# Patient Record
Sex: FEMALE | Race: WHITE | NOT HISPANIC OR LATINO | Employment: UNEMPLOYED | URBAN - METROPOLITAN AREA
[De-identification: names, ages, dates, MRNs, and addresses within clinical notes are randomized per-mention and may not be internally consistent; named-entity substitution may affect disease eponyms.]

---

## 2023-02-16 ENCOUNTER — OFFICE VISIT (OUTPATIENT)
Dept: OBSTETRICS AND GYNECOLOGY | Facility: CLINIC | Age: 48
End: 2023-02-16
Payer: MEDICAID

## 2023-02-16 VITALS
DIASTOLIC BLOOD PRESSURE: 86 MMHG | HEIGHT: 64 IN | WEIGHT: 210.75 LBS | BODY MASS INDEX: 35.98 KG/M2 | SYSTOLIC BLOOD PRESSURE: 124 MMHG

## 2023-02-16 DIAGNOSIS — R10.2 PELVIC PAIN: Primary | ICD-10-CM

## 2023-02-16 DIAGNOSIS — R87.613 HGSIL (HIGH GRADE SQUAMOUS INTRAEPITHELIAL LESION) ON PAP SMEAR OF CERVIX: ICD-10-CM

## 2023-02-16 PROCEDURE — 3079F DIAST BP 80-89 MM HG: CPT | Mod: CPTII,,, | Performed by: SPECIALIST

## 2023-02-16 PROCEDURE — 87624 HPV HI-RISK TYP POOLED RSLT: CPT | Performed by: SPECIALIST

## 2023-02-16 PROCEDURE — 88175 CYTOPATH C/V AUTO FLUID REDO: CPT | Performed by: PATHOLOGY

## 2023-02-16 PROCEDURE — 1159F PR MEDICATION LIST DOCUMENTED IN MEDICAL RECORD: ICD-10-PCS | Mod: CPTII,,, | Performed by: SPECIALIST

## 2023-02-16 PROCEDURE — 99204 PR OFFICE/OUTPT VISIT, NEW, LEVL IV, 45-59 MIN: ICD-10-PCS | Mod: S$PBB,,, | Performed by: SPECIALIST

## 2023-02-16 PROCEDURE — 88141 PR  CYTOPATH CERV/VAG INTERPRET: ICD-10-PCS | Mod: ,,, | Performed by: PATHOLOGY

## 2023-02-16 PROCEDURE — 3074F SYST BP LT 130 MM HG: CPT | Mod: CPTII,,, | Performed by: SPECIALIST

## 2023-02-16 PROCEDURE — 99999 PR PBB SHADOW E&M-NEW PATIENT-LVL III: ICD-10-PCS | Mod: PBBFAC,,, | Performed by: SPECIALIST

## 2023-02-16 PROCEDURE — 99203 OFFICE O/P NEW LOW 30 MIN: CPT | Mod: PBBFAC,PN | Performed by: SPECIALIST

## 2023-02-16 PROCEDURE — 99204 OFFICE O/P NEW MOD 45 MIN: CPT | Mod: S$PBB,,, | Performed by: SPECIALIST

## 2023-02-16 PROCEDURE — 3008F PR BODY MASS INDEX (BMI) DOCUMENTED: ICD-10-PCS | Mod: CPTII,,, | Performed by: SPECIALIST

## 2023-02-16 PROCEDURE — 3008F BODY MASS INDEX DOCD: CPT | Mod: CPTII,,, | Performed by: SPECIALIST

## 2023-02-16 PROCEDURE — 88141 CYTOPATH C/V INTERPRET: CPT | Mod: ,,, | Performed by: PATHOLOGY

## 2023-02-16 PROCEDURE — 3079F PR MOST RECENT DIASTOLIC BLOOD PRESSURE 80-89 MM HG: ICD-10-PCS | Mod: CPTII,,, | Performed by: SPECIALIST

## 2023-02-16 PROCEDURE — 3074F PR MOST RECENT SYSTOLIC BLOOD PRESSURE < 130 MM HG: ICD-10-PCS | Mod: CPTII,,, | Performed by: SPECIALIST

## 2023-02-16 PROCEDURE — 99999 PR PBB SHADOW E&M-NEW PATIENT-LVL III: CPT | Mod: PBBFAC,,, | Performed by: SPECIALIST

## 2023-02-16 PROCEDURE — 1159F MED LIST DOCD IN RCRD: CPT | Mod: CPTII,,, | Performed by: SPECIALIST

## 2023-02-16 RX ORDER — SEMAGLUTIDE 1.34 MG/ML
0.5 INJECTION, SOLUTION SUBCUTANEOUS
COMMUNITY
Start: 2023-01-31

## 2023-02-16 RX ORDER — GABAPENTIN 300 MG/1
CAPSULE ORAL
COMMUNITY
Start: 2023-01-31

## 2023-02-16 NOTE — PROGRESS NOTES
47 y.o. White or  female G 2 P 2 with BTL & history of endometrial ablation who had HSIL Pap with positive HPV on 8/19/2022 & TRAE 2/3 on biopsy of the cervix on 9/6/2022 who is S/P LEEP, FxD&C on 10/7/2022   Pathology from the above as follows...  Pathology-  Final Diagnosis   1. Ectocervix, cone:  - High grade squamous intraepithelial lesion less than 1 mm from the endocervical margin of this specimen.     2. Endocervix, cone:  - High grade squamous intraepithelial lesion.  - True margin appears involved.     3. ECC:  - Fragments of high grade squamous intraepithelial lesion.     4. EMB:  - Limited sample showing a fragment of high grade squamous intraepithelial lesion.  - Possible uterine smooth muscle.  - Endocervical tissue and mucus noted too.        Note: P16 immunochemistry with appropriate controls is performed and findings are incorporated into the above final diagnosis. Chart is reviewed.   Electronically signed by Chandler Chang MD on 10/20/2022 at 1425      Pt denies vaginal bleeding, PP, d/c, weight loss/gain, dysuria, hematuria, n/v.    Past Medical History:   Diagnosis Date    Abnormal Pap smear of cervix     Hepatitis B        Past Surgical History:   Procedure Laterality Date    CERVICAL BIOPSY  W/ LOOP ELECTRODE EXCISION      CHOLECYSTECTOMY      NASAL SEPTUM SURGERY      TUBAL LIGATION         Family History   Problem Relation Age of Onset    Breast cancer Neg Hx     Ovarian cancer Neg Hx        Social History     Socioeconomic History    Marital status:    Tobacco Use    Smoking status: Every Day     Packs/day: 1.00     Years: 22.00     Pack years: 22.00     Types: Cigarettes   Substance and Sexual Activity    Alcohol use: Yes     Alcohol/week: 0.8 standard drinks     Types: 1 drink(s) per week    Drug use: No    Sexual activity: Yes     Partners: Male     Birth control/protection: See Surgical Hx       Current Outpatient Medications   Medication Sig Dispense Refill     ALPRAZolam (XANAX) 0.5 MG tablet Take 0.5 mg by mouth 3 (three) times daily as needed.        gabapentin (NEURONTIN) 300 MG capsule Take by mouth.      OZEMPIC 0.25 mg or 0.5 mg(2 mg/1.5 mL) pen injector Inject 0.5 mg into the skin every 7 days.       No current facility-administered medications for this visit.       Review of patient's allergies indicates:   Allergen Reactions    Penicillins        Review of System:   General: no chills, fever, night sweats, weight gain or weight loss  Psychological: no depression or suicidal ideation  Breasts: no new or changing breast lumps, nipple discharge or masses.  Respiratory: no cough, shortness of breath, or wheezing  Cardiovascular: no chest pain or dyspnea on exertion  Gastrointestinal: no abdominal pain, change in bowel habits, or black or bloody stools  Genito-Urinary: no incontinence, urinary frequency/urgency or vulvar/vaginal symptoms, pelvic pain or abnormal vaginal bleeding.  Musculoskeletal: no gait disturbance or muscular weakness                                               General Appearance    A and O x 4, Cooperative, no distress   Breasts    Abdomen   deferred  Soft, non-tender, bowel sounds active all four quadrants,  no masses, no organomegaly    Genitourinary:   External rectal exam shows no thrombosed external hemorrhoids.   Pelvic exam was performed with patient supine.  No labial fusion.  There is no rash, lesion or injury on the right labia.  There is no rash, lesion or injury on the left labia.  No bleeding and no signs of injury around the vaginal introitus, urethra is without lesions and well supported. The cervix is visualized , MINIMAL CERVICAL STUMP VISABLE,with no discharge, lesions or friability.  No vaginal discharge found.  No significant Cystocele, Enterocele or rectocele, and uterus well supported.  Bimanual exam:  The urethra is normal to palpation and there are no palpable vaginal wall masses.  Uterus is not deviated, not enlarged,  not fixed, normal shape and not tender.  Cervix exhibits no motion tenderness.   Right adnexum displays no mass and no tenderness.  Left adnexum displays no mass and no tenderness.   Extremities: Extremities normal, atraumatic, no cyanosis or edema                     NOTE  NURSING PERSONAL PRESENT FOR ENTIRE PHYSICAL EXAM     PAP submitted    I had a 20 min discussion regarding pathology revealing positive surgical margins  I discussed tx options and feel repeat pap, submitted today and pelvic u/s are indicated.  If no overt abnormality or change in cytology noted I feel direct progression to definative TLH BSO is indicated. Pathology is 3 months old and I explained unlikely however possible progression of pathology but due to this being unlikley I do not feel CKC is indicated unless pt was desiring uterine sparing tx . I explained CKC would potentially uncover progressive change however, there is minimal tissue present on gross exam and pt in addition has documented endometrial ablation which potentially limits sampling senstivity and I feel more agressive approach is reasonable  I will await pap and pelvic u/s and reassess   Answered all questions

## 2023-02-17 ENCOUNTER — HOSPITAL ENCOUNTER (OUTPATIENT)
Dept: RADIOLOGY | Facility: HOSPITAL | Age: 48
Discharge: HOME | End: 2023-02-17
Attending: SPECIALIST
Payer: MEDICAID

## 2023-02-17 DIAGNOSIS — R10.2 PELVIC PAIN: ICD-10-CM

## 2023-02-17 PROCEDURE — 76830 TRANSVAGINAL US NON-OB: CPT | Mod: 26,,, | Performed by: RADIOLOGY

## 2023-02-17 PROCEDURE — 76856 US EXAM PELVIC COMPLETE: CPT | Mod: TC,PO

## 2023-02-17 PROCEDURE — 76856 US PELVIS COMP WITH TRANSVAG NON-OB (XPD): ICD-10-PCS | Mod: 26,,, | Performed by: RADIOLOGY

## 2023-02-17 PROCEDURE — 76856 US EXAM PELVIC COMPLETE: CPT | Mod: 26,,, | Performed by: RADIOLOGY

## 2023-02-17 PROCEDURE — 76830 US PELVIS COMP WITH TRANSVAG NON-OB (XPD): ICD-10-PCS | Mod: 26,,, | Performed by: RADIOLOGY

## 2023-02-22 LAB
HPV HR 12 DNA SPEC QL NAA+PROBE: POSITIVE
HPV16 AG SPEC QL: NEGATIVE
HPV18 DNA SPEC QL NAA+PROBE: NEGATIVE

## 2023-02-23 ENCOUNTER — TELEPHONE (OUTPATIENT)
Dept: OBSTETRICS AND GYNECOLOGY | Facility: CLINIC | Age: 48
End: 2023-02-23
Payer: MEDICAID

## 2023-02-23 LAB
FINAL PATHOLOGIC DIAGNOSIS: ABNORMAL
Lab: ABNORMAL

## 2023-02-23 NOTE — TELEPHONE ENCOUNTER
----- Message from Ming Lizama sent at 2/23/2023 10:42 AM CST -----  Regarding: next steps  Type: Needs Medical Advice    Who Called:  Yue    [facility] Call Back Number: [phone]    Additional Information: pt saw provider and had US done last week. Pt needs to know next steps for procedure. Please call.

## 2023-02-24 DIAGNOSIS — R10.2 PELVIC PAIN: Primary | ICD-10-CM

## 2023-02-24 DIAGNOSIS — R87.613 HGSIL (HIGH GRADE SQUAMOUS INTRAEPITHELIAL LESION) ON PAP SMEAR OF CERVIX: ICD-10-CM

## 2023-03-01 ENCOUNTER — TELEPHONE (OUTPATIENT)
Dept: OBSTETRICS AND GYNECOLOGY | Facility: CLINIC | Age: 48
End: 2023-03-01
Payer: MEDICAID

## 2023-03-01 NOTE — TELEPHONE ENCOUNTER
----- Message from Sara Hood sent at 3/1/2023  2:12 PM CST -----  Contact: Self  Type: Needs Medical Advice  Who Called:  Patient  Patient had tests/ultrasound results in chart, has waited a week and has yet to get a call in regards to those results. Patient would like to know if she should schedule an apt with  or if she needs a call to discuss her options.    Best Call Back Number: [phone]  Additional Information: Please call the patient back to advise. Thanks!

## 2023-03-02 ENCOUNTER — PATIENT MESSAGE (OUTPATIENT)
Dept: OBSTETRICS AND GYNECOLOGY | Facility: CLINIC | Age: 48
End: 2023-03-02
Payer: MEDICAID

## 2023-04-10 ENCOUNTER — OFFICE VISIT (OUTPATIENT)
Dept: OBSTETRICS AND GYNECOLOGY | Facility: CLINIC | Age: 48
End: 2023-04-10
Payer: MEDICAID

## 2023-04-10 VITALS
WEIGHT: 215.63 LBS | BODY MASS INDEX: 37.01 KG/M2 | SYSTOLIC BLOOD PRESSURE: 131 MMHG | HEART RATE: 82 BPM | DIASTOLIC BLOOD PRESSURE: 78 MMHG

## 2023-04-10 DIAGNOSIS — R87.613 HGSIL ON CYTOLOGIC SMEAR OF CERVIX: Primary | ICD-10-CM

## 2023-04-10 PROCEDURE — 99213 PR OFFICE/OUTPT VISIT, EST, LEVL III, 20-29 MIN: ICD-10-PCS | Mod: S$PBB,,, | Performed by: SPECIALIST

## 2023-04-10 PROCEDURE — 3075F SYST BP GE 130 - 139MM HG: CPT | Mod: CPTII,,, | Performed by: SPECIALIST

## 2023-04-10 PROCEDURE — 1159F PR MEDICATION LIST DOCUMENTED IN MEDICAL RECORD: ICD-10-PCS | Mod: CPTII,,, | Performed by: SPECIALIST

## 2023-04-10 PROCEDURE — 1159F MED LIST DOCD IN RCRD: CPT | Mod: CPTII,,, | Performed by: SPECIALIST

## 2023-04-10 PROCEDURE — 99213 OFFICE O/P EST LOW 20 MIN: CPT | Mod: PBBFAC,PN | Performed by: SPECIALIST

## 2023-04-10 PROCEDURE — 3008F PR BODY MASS INDEX (BMI) DOCUMENTED: ICD-10-PCS | Mod: CPTII,,, | Performed by: SPECIALIST

## 2023-04-10 PROCEDURE — 3078F DIAST BP <80 MM HG: CPT | Mod: CPTII,,, | Performed by: SPECIALIST

## 2023-04-10 PROCEDURE — 3075F PR MOST RECENT SYSTOLIC BLOOD PRESS GE 130-139MM HG: ICD-10-PCS | Mod: CPTII,,, | Performed by: SPECIALIST

## 2023-04-10 PROCEDURE — 99213 OFFICE O/P EST LOW 20 MIN: CPT | Mod: S$PBB,,, | Performed by: SPECIALIST

## 2023-04-10 PROCEDURE — 99999 PR PBB SHADOW E&M-EST. PATIENT-LVL III: CPT | Mod: PBBFAC,,, | Performed by: SPECIALIST

## 2023-04-10 PROCEDURE — 3008F BODY MASS INDEX DOCD: CPT | Mod: CPTII,,, | Performed by: SPECIALIST

## 2023-04-10 PROCEDURE — 99999 PR PBB SHADOW E&M-EST. PATIENT-LVL III: ICD-10-PCS | Mod: PBBFAC,,, | Performed by: SPECIALIST

## 2023-04-10 PROCEDURE — 3078F PR MOST RECENT DIASTOLIC BLOOD PRESSURE < 80 MM HG: ICD-10-PCS | Mod: CPTII,,, | Performed by: SPECIALIST

## 2023-04-10 RX ORDER — SODIUM CHLORIDE 9 MG/ML
INJECTION, SOLUTION INTRAVENOUS CONTINUOUS
Status: CANCELLED | OUTPATIENT
Start: 2023-04-10

## 2023-04-10 RX ORDER — MUPIROCIN 20 MG/G
OINTMENT TOPICAL
Status: CANCELLED | OUTPATIENT
Start: 2023-04-10

## 2023-04-10 NOTE — PROGRESS NOTES
47 y.o. White or  female G 2 P 2 with BTL & history of endometrial ablation who had HSIL Pap with positive HPV on 8/19/2022 & TRAE 2/3 on biopsy of the cervix on 9/6/2022 who is S/P LEEP, FxD&C on 10/7/2022   Pathology from the above as follows...  Pathology-  Final Diagnosis   1. Ectocervix, cone:  - High grade squamous intraepithelial lesion less than 1 mm from the endocervical margin of this specimen.     2. Endocervix, cone:  - High grade squamous intraepithelial lesion.  - True margin appears involved.     3. ECC:  - Fragments of high grade squamous intraepithelial lesion.     4. EMB:  - Limited sample showing a fragment of high grade squamous intraepithelial lesion.  - Possible uterine smooth muscle.  - Endocervical tissue and mucus noted too.        Note: P16 immunochemistry with appropriate controls is performed and findings are incorporated into the above final diagnosis. Chart is reviewed.   Electronically signed by Chandler Chang MD on 10/20/2022 at 1425       Pt denies vaginal bleeding, PP, d/c, weight loss/gain, dysuria, hematuria, n/v.   Pt is scheduled to undergo definative TLH BSO  I discussed procedure in detal and discussed risks and benefits including risks of bowel/bladder/ureteral injury uinfection and bleeding  Consents and orders obtained  Past Medical History:   Diagnosis Date    Abnormal Pap smear of cervix     Hepatitis B        Past Surgical History:   Procedure Laterality Date    CERVICAL BIOPSY  W/ LOOP ELECTRODE EXCISION      CHOLECYSTECTOMY      NASAL SEPTUM SURGERY      TUBAL LIGATION         Family History   Problem Relation Age of Onset    Breast cancer Neg Hx     Ovarian cancer Neg Hx        Social History     Socioeconomic History    Marital status:    Tobacco Use    Smoking status: Every Day     Packs/day: 1.00     Years: 22.00     Pack years: 22.00     Types: Cigarettes   Substance and Sexual Activity    Alcohol use: Yes     Alcohol/week: 0.8 standard  drinks     Types: 1 drink(s) per week    Drug use: No    Sexual activity: Yes     Partners: Male     Birth control/protection: See Surgical Hx       Current Outpatient Medications   Medication Sig Dispense Refill    ALPRAZolam (XANAX) 0.5 MG tablet Take 0.5 mg by mouth 3 (three) times daily as needed.        [START ON 4/11/2023] chlorhexidine (PERIDEX) 0.12 % solution Use as directed 10 mLs in the mouth or throat 2 (two) times daily.      gabapentin (NEURONTIN) 300 MG capsule Take by mouth.      [START ON 4/11/2023] mupirocin (BACTROBAN) 2 % ointment Apply topically 2 (two) times daily.      OZEMPIC 0.25 mg or 0.5 mg(2 mg/1.5 mL) pen injector Inject 0.5 mg into the skin every 7 days.       No current facility-administered medications for this visit.       Review of patient's allergies indicates:   Allergen Reactions    Penicillins        Review of System:   General: no chills, fever, night sweats, weight gain or weight loss  Psychological: no depression or suicidal ideation  Breasts: no new or changing breast lumps, nipple discharge or masses.  Respiratory: no cough, shortness of breath, or wheezing  Cardiovascular: no chest pain or dyspnea on exertion  Gastrointestinal: no abdominal pain, change in bowel habits, or black or bloody stools  Genito-Urinary: no incontinence, urinary frequency/urgency or vulvar/vaginal symptoms, or abnormal vaginal bleeding.  Musculoskeletal: no gait disturbance or muscular weakness     PE  See Epic      I spent a total of 30 minutes on the day of the visit. This includes face to face time and non-face to face time preparing to see the patient (eg, review of tests), obtaining and/or reviewing separately obtained history, documenting clinical information in the electronic or other health record, independently interpreting results and communicating results to the patient/family/caregiver, or care coordinator.     Will review preop results and proceed at [facility] as scheduled

## 2023-04-13 ENCOUNTER — TELEPHONE (OUTPATIENT)
Dept: OBSTETRICS AND GYNECOLOGY | Facility: CLINIC | Age: 48
End: 2023-04-13

## 2023-04-13 PROBLEM — R87.613 HGSIL (HIGH GRADE SQUAMOUS INTRAEPITHELIAL LESION) ON PAP SMEAR OF CERVIX: Status: ACTIVE | Noted: 2023-04-13

## 2023-04-13 NOTE — TELEPHONE ENCOUNTER
Patient wanted to know is she needed to come in for a 1 week. Pt notified that unless she is having any problems, she can come in at her 2 weeks appointment.

## 2023-04-14 ENCOUNTER — NURSE TRIAGE (OUTPATIENT)
Dept: ADMINISTRATIVE | Facility: CLINIC | Age: 48
End: 2023-04-14
Payer: MEDICAID

## 2023-04-14 ENCOUNTER — TELEPHONE (OUTPATIENT)
Dept: OBSTETRICS AND GYNECOLOGY | Facility: CLINIC | Age: 48
End: 2023-04-14
Payer: MEDICAID

## 2023-04-14 RX ORDER — ONDANSETRON 8 MG/1
8 TABLET, ORALLY DISINTEGRATING ORAL EVERY 8 HOURS PRN
Qty: 20 TABLET | Refills: 0 | Status: SHIPPED | OUTPATIENT
Start: 2023-04-14

## 2023-04-14 NOTE — TELEPHONE ENCOUNTER
Patient states she had a hysterectomy yesterday by Dr. Lima. She thinks she is having a reaction to Percocet. She started itching some yesterday, but now she has widespread hives from her feet to her arms. She denies difficulty breathing and swelling of face, tongue, and lips. She is also having nausea and requesting Zofran. Instructed patient to stop medication and take benadryl per protocol. Contacted Dr. Leonardo, on call, who gave a verbal order for a Zofran prescription, see medication record. Phoned in to patient's preferred pharmacy. Unable to prescribe new narcotics. Patient can try to manage pain with tylenol and ibuprofen; otherwise, she will need to go to [facility] or the ED. Patient verbalized understanding. Instructed to call back if symptoms worsen.     Reason for Disposition   Hives or itching    Additional Information   Negative: [1] Life-threatening reaction (anaphylaxis) in the past to the same drug AND [2] < 2 hours since exposure   Negative: Difficulty breathing or wheezing   Negative: [1] Hoarseness or cough AND [2] started soon after 1st dose of drug   Negative: [1] Swollen tongue AND [2] started soon after 1st dose of drug   Negative: [1] Purple or blood-colored rash (spots or dots) AND [2] fever   Negative: Sounds like a life-threatening emergency to the triager   Negative: Swollen tongue   Negative: [1] Widespread hives AND [2] onset < 2 hours of exposure to 1st dose of drug   Negative: Fever   Negative: Patient sounds very sick or weak to the triager   Negative: [1] Purple or blood-colored rash (spots or dots) AND [2] no fever AND [3] sounds well to triager   Negative: [1] Taking new prescription medication AND [2] rash within 4 hours of 1st dose   Negative: Large or small blisters on skin (i.e., fluid filled bubbles or sacs)   Negative: Bloody crusts on lips or sores in mouth   Negative: Face or lip swelling    Protocols used: Rash - Widespread On Drugs-A-AH

## 2023-04-14 NOTE — TELEPHONE ENCOUNTER
Patient called and stated that the percocet is causing her to be itchy and nauseated and would like to know if something else can be called in for her.    Please advise.

## 2023-04-14 NOTE — TELEPHONE ENCOUNTER
----- Message from Natalie Medina, Patient Care Assistant sent at 4/14/2023 11:20 AM CDT -----  Regarding: advice  Contact: pt  Type: Needs Medical Advice    Who Called:  pt     Symptoms (please be specific):  itchy and nausea   How long has patient had these symptoms:  1 day     Pharmacy name and phone #:    [facility] - [place] - [address] [facility] [address] [facility]  [address] [place] [address]  Phone: [phone] Fax: [phone]    [facility] [[email or web address]] Call Back Number: [phone] (home)     Additional Information: pt states she would like a callback regarding oxyCODONE-acetaminophen (PERCOCET) 5-325 mg per tablet. Please call pt to advise. Thanks!

## 2023-04-27 ENCOUNTER — OFFICE VISIT (OUTPATIENT)
Dept: OBSTETRICS AND GYNECOLOGY | Facility: CLINIC | Age: 48
End: 2023-04-27
Payer: MEDICAID

## 2023-04-27 VITALS
DIASTOLIC BLOOD PRESSURE: 76 MMHG | HEART RATE: 97 BPM | WEIGHT: 213.19 LBS | SYSTOLIC BLOOD PRESSURE: 98 MMHG | BODY MASS INDEX: 36.4 KG/M2 | HEIGHT: 64 IN | RESPIRATION RATE: 18 BRPM

## 2023-04-27 DIAGNOSIS — R87.613 HGSIL ON CYTOLOGIC SMEAR OF CERVIX: Primary | ICD-10-CM

## 2023-04-27 PROCEDURE — 3008F PR BODY MASS INDEX (BMI) DOCUMENTED: ICD-10-PCS | Mod: CPTII,,, | Performed by: SPECIALIST

## 2023-04-27 PROCEDURE — 3008F BODY MASS INDEX DOCD: CPT | Mod: CPTII,,, | Performed by: SPECIALIST

## 2023-04-27 PROCEDURE — 99999 PR PBB SHADOW E&M-EST. PATIENT-LVL III: ICD-10-PCS | Mod: PBBFAC,,, | Performed by: SPECIALIST

## 2023-04-27 PROCEDURE — 3074F SYST BP LT 130 MM HG: CPT | Mod: CPTII,,, | Performed by: SPECIALIST

## 2023-04-27 PROCEDURE — 99024 PR POST-OP FOLLOW-UP VISIT: ICD-10-PCS | Mod: ,,, | Performed by: SPECIALIST

## 2023-04-27 PROCEDURE — 1159F PR MEDICATION LIST DOCUMENTED IN MEDICAL RECORD: ICD-10-PCS | Mod: CPTII,,, | Performed by: SPECIALIST

## 2023-04-27 PROCEDURE — 1159F MED LIST DOCD IN RCRD: CPT | Mod: CPTII,,, | Performed by: SPECIALIST

## 2023-04-27 PROCEDURE — 99024 POSTOP FOLLOW-UP VISIT: CPT | Mod: ,,, | Performed by: SPECIALIST

## 2023-04-27 PROCEDURE — 99999 PR PBB SHADOW E&M-EST. PATIENT-LVL III: CPT | Mod: PBBFAC,,, | Performed by: SPECIALIST

## 2023-04-27 PROCEDURE — 3078F DIAST BP <80 MM HG: CPT | Mod: CPTII,,, | Performed by: SPECIALIST

## 2023-04-27 PROCEDURE — 3074F PR MOST RECENT SYSTOLIC BLOOD PRESSURE < 130 MM HG: ICD-10-PCS | Mod: CPTII,,, | Performed by: SPECIALIST

## 2023-04-27 PROCEDURE — 99213 OFFICE O/P EST LOW 20 MIN: CPT | Mod: PBBFAC,PN | Performed by: SPECIALIST

## 2023-04-27 PROCEDURE — 3044F PR MOST RECENT HEMOGLOBIN A1C LEVEL <7.0%: ICD-10-PCS | Mod: CPTII,,, | Performed by: SPECIALIST

## 2023-04-27 PROCEDURE — 3078F PR MOST RECENT DIASTOLIC BLOOD PRESSURE < 80 MM HG: ICD-10-PCS | Mod: CPTII,,, | Performed by: SPECIALIST

## 2023-04-27 PROCEDURE — 3044F HG A1C LEVEL LT 7.0%: CPT | Mod: CPTII,,, | Performed by: SPECIALIST

## 2023-04-27 NOTE — PROGRESS NOTES
47 yo WF s/p TLH BSO presents for PO eval Pt denies f/c, vaginal bleeding, flank pain, dysuria, hematuria, n/v.  Does have occasional night sweats and VMFs  Pathology reveiwed revealing the following...  [facility]   [address] [place] [address]                         Phone [phone]   DIAGNOSIS:   04/17/2023 PMB/kl     UTERUS, CERVIX, BILATERAL TUBES AND OVARIES:     CERVIX:   - HIGH GRADE SQUAMOUS INTRAEPITHELIAL LESION.   - THE EXOCERVICAL MARGIN OF RESECTION IS FREE OF DYSPLASIA.   - CHRONIC CERVICITIS.     Comment: The entire cervix was embedded due to the given clinical    history.     ENDOMETRIUM:   - BASAL ENDOMETRIUM.     MYOMETRIUM:   - NO SIGNIFICANT HISTOPATHOLOGIC CHANGES.     RIGHT FALLOPIAN TUBE:   - NO SIGNIFICANT HISTOPATHOLOGIC CHANGES.     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     LEFT FALLOPIAN TUBE:   - NO SIGNIFICANT HISTOPATHOLOGIC CHANGES.     LEFT OVARY:   - ATROPHY.   - BENIGN SURFACE INCLUSION CYSTS.     Past Medical History:   Diagnosis Date    Abnormal Pap smear of cervix     Hepatitis B        Past Surgical History:   Procedure Laterality Date    CERVICAL BIOPSY  W/ LOOP ELECTRODE EXCISION      CHOLECYSTECTOMY      LAPAROSCOPIC SALPINGO-OOPHORECTOMY Bilateral 4/13/2023    Procedure: SALPINGO-OOPHORECTOMY, LAPAROSCOPIC;  Surgeon: Mariano Lima MD;  Location: [facility] OR;  Service: OB/GYN;  Laterality: Bilateral;    LAPAROSCOPIC TOTAL HYSTERECTOMY N/A 4/13/2023    Procedure: HYSTERECTOMY, TOTAL, LAPAROSCOPIC;  Surgeon: Mariano Lima MD;  Location: [facility] OR;  Service: OB/GYN;  Laterality: N/A;    NASAL SEPTUM SURGERY      TUBAL LIGATION         Family History   Problem Relation Age of Onset    Breast cancer Neg Hx     Ovarian cancer Neg Hx        Social History     Socioeconomic History    Marital status: Significant Other   Tobacco Use    Smoking status: Every Day     Packs/day: 1.00     Years: 22.00     Pack years:  22.00     Types: Cigarettes   Substance and Sexual Activity    Alcohol use: Yes     Alcohol/week: 0.8 standard drinks     Types: 1 drink(s) per week    Drug use: No    Sexual activity: Yes     Partners: Male     Birth control/protection: See Surgical Hx       Current Outpatient Medications   Medication Sig Dispense Refill    ALPRAZolam (XANAX) 0.5 MG tablet Take 0.5 mg by mouth 3 (three) times daily as needed.        gabapentin (NEURONTIN) 300 MG capsule Take by mouth.      ibuprofen (ADVIL,MOTRIN) 400 MG tablet Take 400 mg by mouth every 6 (six) hours as needed for Other.      ondansetron (ZOFRAN-ODT) 8 MG TbDL Take 1 tablet (8 mg total) by mouth every 8 (eight) hours as needed (nausea). 20 tablet 0    OZEMPIC 0.25 mg or 0.5 mg(2 mg/1.5 mL) pen injector Inject 0.5 mg into the skin every 7 days.      oxyCODONE-acetaminophen (PERCOCET) 5-325 mg per tablet Take 1 tablet by mouth every 4 (four) hours as needed for Pain. 20 tablet 0     No current facility-administered medications for this visit.     Facility-Administered Medications Ordered in Other Visits   Medication Dose Route Frequency Provider Last Rate Last Admin    HYDROmorphone injection 0.5 mg  0.5 mg Intravenous Q5 Min PRN Jacqueline Volpi-Abadie, MD   0.5 mg at 04/13/23 0854    LORazepam injection 0.25 mg  0.25 mg Intravenous Q15 Min PRN Jacqueline Volpi-Abadie, MD   0.25 mg at 04/13/23 0838    ondansetron injection 4 mg  4 mg Intravenous Daily PRN Jacqueline Volpi-Abadie, MD   4 mg at 04/13/23 0817    oxyCODONE-acetaminophen 5-325 mg per tablet 1 tablet  1 tablet Oral Q3H PRN Jacqueline Volpi-Abadie, MD        sodium chloride 0.9% flush 3 mL  3 mL Intravenous PRN Jacqueline Volpi-Abadie, MD           Review of patient's allergies indicates:   Allergen Reactions    Penicillins        Review of System:   General: no chills, fever, night sweats, weight gain or weight loss  Psychological: no depression or suicidal ideation  Breasts: no new or changing breast  lumps, nipple discharge or masses.  Respiratory: no cough, shortness of breath, or wheezing  Cardiovascular: no chest pain or dyspnea on exertion  Gastrointestinal: no abdominal pain, change in bowel habits, or black or bloody stools  Genito-Urinary: no incontinence, urinary frequency/urgency or vulvar/vaginal symptoms, pelvic pain or abnormal vaginal bleeding.  Musculoskeletal: no gait disturbance or muscular weakness     Abdomen soft incisions well approx No crepitance erythema or discharge  ND no rebound    Discussed pathology  Discussed pelvic rest x 6 weeks  Will have pt RTO 6 weeks or final Po check    I spent a total of 30 minutes on the day of the visit. This includes face to face time and non-face to face time preparing to see the patient (eg, review of tests), obtaining and/or reviewing separately obtained history, documenting clinical information in the electronic or other health record, independently interpreting results and communicating results to the patient/family/caregiver, or care coordinator.

## 2023-04-28 ENCOUNTER — TELEPHONE (OUTPATIENT)
Dept: OBSTETRICS AND GYNECOLOGY | Facility: CLINIC | Age: 48
End: 2023-04-28
Payer: MEDICAID

## 2023-04-28 NOTE — TELEPHONE ENCOUNTER
----- Message from Stephany Lai sent at 4/28/2023  8:43 AM CDT -----  Who Called: [facility]    What is the request in detail: Requesting call back to discuss sterilization consent form. Pls advise     Can the clinic reply by [facility]? No    Best Call Back Number: [phone]    Additional Information: form faxed over

## 2023-05-01 ENCOUNTER — TELEPHONE (OUTPATIENT)
Dept: OBSTETRICS AND GYNECOLOGY | Facility: CLINIC | Age: 48
End: 2023-05-01
Payer: MEDICAID

## 2023-05-01 NOTE — TELEPHONE ENCOUNTER
----- Message from Maine Duggan sent at 5/1/2023  4:47 PM CDT -----  Contact: Self  Type: Needs Medical Advice  Who Called:  Patient  Symptoms (please be specific):  Pt had a complete hysterectomy on 04/13 and saw Dr Lima for her post op appt on 04/27 and forgot to ask if she can take a bath yet, or still only showers.  Best Call Back Number: [phone]  Additional Information: Please call pt back to advise. Thank You.

## 2023-05-01 NOTE — TELEPHONE ENCOUNTER
Pt called wanting to know if she can take a bath, Informed the pt that we usually wait until after the 6 week post op appt  Pt verb understanding

## 2023-05-24 ENCOUNTER — OFFICE VISIT (OUTPATIENT)
Dept: OBSTETRICS AND GYNECOLOGY | Facility: CLINIC | Age: 48
End: 2023-05-24
Payer: MEDICAID

## 2023-05-24 VITALS
BODY MASS INDEX: 35.95 KG/M2 | DIASTOLIC BLOOD PRESSURE: 74 MMHG | SYSTOLIC BLOOD PRESSURE: 130 MMHG | WEIGHT: 210.56 LBS | HEIGHT: 64 IN

## 2023-05-24 DIAGNOSIS — R87.613 HGSIL ON CYTOLOGIC SMEAR OF CERVIX: Primary | ICD-10-CM

## 2023-05-24 PROCEDURE — 99213 OFFICE O/P EST LOW 20 MIN: CPT | Mod: PBBFAC,PN | Performed by: SPECIALIST

## 2023-05-24 PROCEDURE — 1159F PR MEDICATION LIST DOCUMENTED IN MEDICAL RECORD: ICD-10-PCS | Mod: CPTII,,, | Performed by: SPECIALIST

## 2023-05-24 PROCEDURE — 99024 PR POST-OP FOLLOW-UP VISIT: ICD-10-PCS | Mod: ,,, | Performed by: SPECIALIST

## 2023-05-24 PROCEDURE — 3044F HG A1C LEVEL LT 7.0%: CPT | Mod: CPTII,,, | Performed by: SPECIALIST

## 2023-05-24 PROCEDURE — 99999 PR PBB SHADOW E&M-EST. PATIENT-LVL III: CPT | Mod: PBBFAC,,, | Performed by: SPECIALIST

## 2023-05-24 PROCEDURE — 3078F DIAST BP <80 MM HG: CPT | Mod: CPTII,,, | Performed by: SPECIALIST

## 2023-05-24 PROCEDURE — 99024 POSTOP FOLLOW-UP VISIT: CPT | Mod: ,,, | Performed by: SPECIALIST

## 2023-05-24 PROCEDURE — 3078F PR MOST RECENT DIASTOLIC BLOOD PRESSURE < 80 MM HG: ICD-10-PCS | Mod: CPTII,,, | Performed by: SPECIALIST

## 2023-05-24 PROCEDURE — 3008F BODY MASS INDEX DOCD: CPT | Mod: CPTII,,, | Performed by: SPECIALIST

## 2023-05-24 PROCEDURE — 3075F PR MOST RECENT SYSTOLIC BLOOD PRESS GE 130-139MM HG: ICD-10-PCS | Mod: CPTII,,, | Performed by: SPECIALIST

## 2023-05-24 PROCEDURE — 3044F PR MOST RECENT HEMOGLOBIN A1C LEVEL <7.0%: ICD-10-PCS | Mod: CPTII,,, | Performed by: SPECIALIST

## 2023-05-24 PROCEDURE — 1159F MED LIST DOCD IN RCRD: CPT | Mod: CPTII,,, | Performed by: SPECIALIST

## 2023-05-24 PROCEDURE — 3008F PR BODY MASS INDEX (BMI) DOCUMENTED: ICD-10-PCS | Mod: CPTII,,, | Performed by: SPECIALIST

## 2023-05-24 PROCEDURE — 99999 PR PBB SHADOW E&M-EST. PATIENT-LVL III: ICD-10-PCS | Mod: PBBFAC,,, | Performed by: SPECIALIST

## 2023-05-24 PROCEDURE — 3075F SYST BP GE 130 - 139MM HG: CPT | Mod: CPTII,,, | Performed by: SPECIALIST

## 2023-05-24 NOTE — PROGRESS NOTES
47 yo WF 6 weeks s/p [facility] BSO presents for final PO eval Denies f/c, pain, vaginal bleeding or spotting, dysuria  Past Medical History:   Diagnosis Date    Abnormal Pap smear of cervix     Hepatitis B        Past Surgical History:   Procedure Laterality Date    CERVICAL BIOPSY  W/ LOOP ELECTRODE EXCISION      CHOLECYSTECTOMY      LAPAROSCOPIC SALPINGO-OOPHORECTOMY Bilateral 4/13/2023    Procedure: SALPINGO-OOPHORECTOMY, LAPAROSCOPIC;  Surgeon: Mariano Lima MD;  Location: [facility] OR;  Service: OB/GYN;  Laterality: Bilateral;    LAPAROSCOPIC TOTAL HYSTERECTOMY N/A 4/13/2023    Procedure: HYSTERECTOMY, TOTAL, LAPAROSCOPIC;  Surgeon: Mariano Lima MD;  Location: [facility] OR;  Service: OB/GYN;  Laterality: N/A;    NASAL SEPTUM SURGERY      TUBAL LIGATION         Family History   Problem Relation Age of Onset    Breast cancer Neg Hx     Ovarian cancer Neg Hx        Social History     Socioeconomic History    Marital status: Significant Other   Tobacco Use    Smoking status: Every Day     Packs/day: 1.00     Years: 22.00     Pack years: 22.00     Types: Cigarettes   Substance and Sexual Activity    Alcohol use: Yes     Alcohol/week: 0.8 standard drinks     Types: 1 drink(s) per week    Drug use: No    Sexual activity: Yes     Partners: Male     Birth control/protection: See Surgical Hx       Current Outpatient Medications   Medication Sig Dispense Refill    ALPRAZolam (XANAX) 0.5 MG tablet Take 0.5 mg by mouth 3 (three) times daily as needed.        gabapentin (NEURONTIN) 300 MG capsule Take by mouth.      OZEMPIC 0.25 mg or 0.5 mg(2 mg/1.5 mL) pen injector Inject 0.5 mg into the skin every 7 days.      ibuprofen (ADVIL,MOTRIN) 400 MG tablet Take 400 mg by mouth every 6 (six) hours as needed for Other.      ondansetron (ZOFRAN-ODT) 8 MG TbDL Take 1 tablet (8 mg total) by mouth every 8 (eight) hours as needed (nausea). (Patient not taking: Reported on 5/24/2023) 20 tablet 0    oxyCODONE-acetaminophen (PERCOCET) 5-325 mg  per tablet Take 1 tablet by mouth every 4 (four) hours as needed for Pain. 20 tablet 0     No current facility-administered medications for this visit.     Facility-Administered Medications Ordered in Other Visits   Medication Dose Route Frequency Provider Last Rate Last Admin    HYDROmorphone injection 0.5 mg  0.5 mg Intravenous Q5 Min PRN Jacqueline Volpi-Abadie, MD   0.5 mg at 04/13/23 0854    LORazepam injection 0.25 mg  0.25 mg Intravenous Q15 Min PRN Jacqueline Volpi-Abadie, MD   0.25 mg at 04/13/23 0838    ondansetron injection 4 mg  4 mg Intravenous Daily PRN Jacqueline Volpi-Abadie, MD   4 mg at 04/13/23 0817    oxyCODONE-acetaminophen 5-325 mg per tablet 1 tablet  1 tablet Oral Q3H PRN Jacqueline Volpi-Abadie, MD        sodium chloride 0.9% flush 3 mL  3 mL Intravenous PRN Jacqueline Volpi-Abadie, MD           Review of patient's allergies indicates:   Allergen Reactions    Penicillins        Review of System:   General: no chills, fever, night sweats, weight gain or weight loss  Psychological: no depression or suicidal ideation  Breasts: no new or changing breast lumps, nipple discharge or masses.  Respiratory: no cough, shortness of breath, or wheezing  Cardiovascular: no chest pain or dyspnea on exertion  Gastrointestinal: no abdominal pain, change in bowel habits, or black or bloody stools  Genito-Urinary: no incontinence, urinary frequency/urgency or vulvar/vaginal symptoms, pelvic pain or abnormal vaginal bleeding.  Musculoskeletal: no gait disturbance or muscular weakness     PE:   APPEARANCE: Well nourished, well developed, in no acute distress.  NECK: Neck symmetric without masses or thyromegaly.  NODES: No inguinal lymph node enlargement.  ABDOMEN: Soft. No tenderness or masses. No hepatosplenomegaly. No hernias.  BREASTS: deferred  PELVIC: Normal external female genitalia without lesions. Normal hair distribution. Adequate perineal body, normal urethral meatus. Vagina moist and well rugated  without lesions or discharge. No significant cystocele or rectocele. Uterus and cervix surgically absent. Bimanual exam revealed no masses, tenderness or abnormality.    NOTE  NURSING PERSONAL PRESENT FOR ENTIRE PHYSICAL EXAM     Discussed vaginal cuff well healed  Rec pelvic rest x 2 weeks and then resume normal activity  May RTO 1 year/prn    I spent a total of 30 minutes on the day of the visit. This includes face to face time and non-face to face time preparing to see the patient (eg, review of tests), obtaining and/or reviewing separately obtained history, documenting clinical information in the electronic or other health record, independently interpreting results and communicating results to the patient/family/caregiver, or care coordinator.

## 2023-11-06 RX ORDER — CHLORHEXIDINE GLUCONATE ORAL RINSE 1.2 MG/ML
SOLUTION DENTAL
Qty: 473 ML | Refills: 0 | Status: SHIPPED | OUTPATIENT
Start: 2023-11-06

## 2024-05-27 ENCOUNTER — OFFICE VISIT (OUTPATIENT)
Dept: OBSTETRICS AND GYNECOLOGY | Facility: CLINIC | Age: 49
End: 2024-05-27
Payer: MEDICAID

## 2024-05-27 VITALS
SYSTOLIC BLOOD PRESSURE: 114 MMHG | BODY MASS INDEX: 39.64 KG/M2 | WEIGHT: 223.75 LBS | DIASTOLIC BLOOD PRESSURE: 68 MMHG | HEIGHT: 63 IN

## 2024-05-27 DIAGNOSIS — Z01.419 WELL WOMAN EXAM: Primary | ICD-10-CM

## 2024-05-27 PROCEDURE — 99213 OFFICE O/P EST LOW 20 MIN: CPT | Mod: PBBFAC,PN | Performed by: SPECIALIST

## 2024-05-27 PROCEDURE — 3008F BODY MASS INDEX DOCD: CPT | Mod: CPTII,,, | Performed by: SPECIALIST

## 2024-05-27 PROCEDURE — 99999 PR PBB SHADOW E&M-EST. PATIENT-LVL III: CPT | Mod: PBBFAC,,, | Performed by: SPECIALIST

## 2024-05-27 PROCEDURE — 1159F MED LIST DOCD IN RCRD: CPT | Mod: CPTII,,, | Performed by: SPECIALIST

## 2024-05-27 PROCEDURE — 99396 PREV VISIT EST AGE 40-64: CPT | Mod: S$PBB,,, | Performed by: SPECIALIST

## 2024-05-27 PROCEDURE — 3078F DIAST BP <80 MM HG: CPT | Mod: CPTII,,, | Performed by: SPECIALIST

## 2024-05-27 PROCEDURE — 3074F SYST BP LT 130 MM HG: CPT | Mod: CPTII,,, | Performed by: SPECIALIST

## 2024-05-27 RX ORDER — BACLOFEN 10 MG/1
10 TABLET ORAL
COMMUNITY

## 2024-05-27 RX ORDER — TOPIRAMATE 25 MG/1
25 TABLET ORAL 2 TIMES DAILY
COMMUNITY

## 2024-05-27 RX ORDER — FAMOTIDINE 20 MG/1
1 TABLET, FILM COATED ORAL 2 TIMES DAILY
COMMUNITY
Start: 2023-08-01

## 2024-05-27 NOTE — PROGRESS NOTES
48 yo WF 1 year s/p [facility] BSO presents for annual gyn eval  no overt gyn complaints  Past Medical History:   Diagnosis Date    Abnormal Pap smear of cervix     Hepatitis B        Past Surgical History:   Procedure Laterality Date    APPENDECTOMY      CERVICAL BIOPSY  W/ LOOP ELECTRODE EXCISION      CHOLECYSTECTOMY      LAPAROSCOPIC SALPINGO-OOPHORECTOMY Bilateral 04/13/2023    Procedure: SALPINGO-OOPHORECTOMY, LAPAROSCOPIC;  Surgeon: Mariano Lima MD;  Location: [facility] OR;  Service: OB/GYN;  Laterality: Bilateral;    LAPAROSCOPIC TOTAL HYSTERECTOMY N/A 04/13/2023    Procedure: HYSTERECTOMY, TOTAL, LAPAROSCOPIC;  Surgeon: Mariano Lima MD;  Location: [facility] OR;  Service: OB/GYN;  Laterality: N/A;    NASAL SEPTUM SURGERY      TUBAL LIGATION         Family History   Problem Relation Name Age of Onset    Breast cancer Neg Hx      Ovarian cancer Neg Hx         Social History     Socioeconomic History    Marital status: Significant Other   Tobacco Use    Smoking status: Every Day     Current packs/day: 1.00     Average packs/day: 1 pack/day for 22.0 years (22.0 ttl pk-yrs)     Types: Cigarettes   Substance and Sexual Activity    Alcohol use: Yes     Alcohol/week: 0.8 standard drinks of alcohol     Types: 1 drink(s) per week     Comment: rarely    Drug use: No    Sexual activity: Yes     Partners: Male     Birth control/protection: See Surgical Hx       Current Outpatient Medications   Medication Sig Dispense Refill    ALPRAZolam (XANAX) 0.5 MG tablet Take 0.5 mg by mouth 3 (three) times daily as needed.        baclofen (LIORESAL) 10 MG tablet Take 10 mg by mouth.      famotidine (PEPCID) 20 MG tablet Take 1 tablet by mouth 2 (two) times daily.      gabapentin (NEURONTIN) 300 MG capsule Take by mouth.      ibuprofen (ADVIL,MOTRIN) 400 MG tablet Take 400 mg by mouth every 6 (six) hours as needed for Other.      topiramate (TOPAMAX) 25 MG tablet Take 25 mg by mouth 2 (two) times daily.      chlorhexidine (PERIDEX) 0.12 %  solution SWISH AND SPIT TEN MILLILITERS BY MOUTH TWICE DAILY FOR 5 DAYS. START 4/11/23 (Patient not taking: Reported on 5/27/2024) 473 mL 0    ondansetron (ZOFRAN-ODT) 8 MG TbDL Take 1 tablet (8 mg total) by mouth every 8 (eight) hours as needed (nausea). (Patient not taking: Reported on 5/24/2023) 20 tablet 0    oxyCODONE-acetaminophen (PERCOCET) 5-325 mg per tablet Take 1 tablet by mouth every 4 (four) hours as needed for Pain. 20 tablet 0    OZEMPIC 0.25 mg or 0.5 mg(2 mg/1.5 mL) pen injector Inject 0.5 mg into the skin every 7 days. (Patient not taking: Reported on 5/27/2024)       No current facility-administered medications for this visit.     Facility-Administered Medications Ordered in Other Visits   Medication Dose Route Frequency Provider Last Rate Last Admin    HYDROmorphone injection 0.5 mg  0.5 mg Intravenous Q5 Min PRN Volpi-Abadie, Jacqueline, MD   0.5 mg at 04/13/23 0854    LORazepam injection 0.25 mg  0.25 mg Intravenous Q15 Min PRN Volpi-Abadie, Jacqueline, MD   0.25 mg at 04/13/23 0838    ondansetron injection 4 mg  4 mg Intravenous Daily PRN Volpi-Abadie, Jacqueline, MD   4 mg at 04/13/23 0817    oxyCODONE-acetaminophen 5-325 mg per tablet 1 tablet  1 tablet Oral Q3H PRN Volpi-Abadie, Jacqueline, MD        sodium chloride 0.9% flush 3 mL  3 mL Intravenous PRN Volpi-Abadie, Jacqueline, MD           Review of patient's allergies indicates:   Allergen Reactions    Penicillins        Review of System:   General: no chills, fever, night sweats, weight gain or weight loss  Psychological: no depression or suicidal ideation  Breasts: no new or changing breast lumps, nipple discharge or masses.  Respiratory: no cough, shortness of breath, or wheezing  Cardiovascular: no chest pain or dyspnea on exertion  Gastrointestinal: no abdominal pain, change in bowel habits, or black or bloody stools  Genito-Urinary: no incontinence, urinary frequency/urgency or vulvar/vaginal symptoms, pelvic pain or abnormal  vaginal bleeding.  Musculoskeletal: no gait disturbance or muscular weakness     PE:   APPEARANCE: Well nourished, well developed, in no acute distress.  NECK: Neck symmetric without masses or thyromegaly.  NODES: No inguinal lymph node enlargement.  ABDOMEN: Soft. No tenderness or masses. No hepatosplenomegaly. No hernias.  BREASTS: Symmetrical, no skin changes or visible lesions. No palpable masses, nipple discharge or adenopathy bilaterally.  PELVIC: Normal external female genitalia without lesions. Normal hair distribution. Adequate perineal body, normal urethral meatus. Vagina moist and well rugated without lesions or discharge. No significant cystocele or rectocele. Uterus and cervix surgically absent. Bimanual exam revealed no masses, tenderness or abnormality.    NOTE  NURSING PERSONAL PRESENT FOR ENTIRE PHYSICAL EXAM     Plan  BSE monthly  Schedule screening mammogram with PCP  Daily calcium intake  RTO 2 years/prn    I spent a total of 30 minutes on the day of the visit. This includes face to face time and non-face to face time preparing to see the patient (eg, review of tests), obtaining and/or reviewing separately obtained history, documenting clinical information in the electronic or other health record, independently interpreting results and communicating results to the patient/family/caregiver, or care coordinator.

## 2025-05-28 ENCOUNTER — OFFICE VISIT (OUTPATIENT)
Dept: OBSTETRICS AND GYNECOLOGY | Facility: CLINIC | Age: 50
End: 2025-05-28
Payer: MEDICAID

## 2025-05-28 VITALS
SYSTOLIC BLOOD PRESSURE: 110 MMHG | DIASTOLIC BLOOD PRESSURE: 78 MMHG | BODY MASS INDEX: 39.52 KG/M2 | WEIGHT: 223.13 LBS

## 2025-05-28 DIAGNOSIS — Z01.411 ENCOUNTER FOR GYNECOLOGICAL EXAMINATION WITH ABNORMAL FINDING: ICD-10-CM

## 2025-05-28 DIAGNOSIS — L91.8 CUTANEOUS SKIN TAGS: Primary | ICD-10-CM

## 2025-05-28 PROCEDURE — 99999 PR PBB SHADOW E&M-EST. PATIENT-LVL III: CPT | Mod: PBBFAC,,, | Performed by: SPECIALIST

## 2025-05-28 PROCEDURE — 99213 OFFICE O/P EST LOW 20 MIN: CPT | Mod: PBBFAC,PN | Performed by: SPECIALIST

## 2025-05-28 RX ORDER — ETODOLAC 400 MG/1
400 TABLET, FILM COATED ORAL 2 TIMES DAILY PRN
COMMUNITY
Start: 2025-05-23

## 2025-05-28 NOTE — PROGRESS NOTES
49 yo obese WF , history [facility] BSO presents for annual gyn eval  Does complain perineal skin tags and desires removal  Past Medical History:   Diagnosis Date    Abnormal Pap smear of cervix     Hepatitis B        Past Surgical History:   Procedure Laterality Date    APPENDECTOMY      CERVICAL BIOPSY  W/ LOOP ELECTRODE EXCISION      CHOLECYSTECTOMY      LAPAROSCOPIC SALPINGO-OOPHORECTOMY Bilateral 04/13/2023    Procedure: SALPINGO-OOPHORECTOMY, LAPAROSCOPIC;  Surgeon: Mariano Lima MD;  Location: [facility] OR;  Service: OB/GYN;  Laterality: Bilateral;    LAPAROSCOPIC TOTAL HYSTERECTOMY N/A 04/13/2023    Procedure: HYSTERECTOMY, TOTAL, LAPAROSCOPIC;  Surgeon: Mariano Lima MD;  Location: [facility] OR;  Service: OB/GYN;  Laterality: N/A;    NASAL SEPTUM SURGERY      RHIZOTOMY  09/2024    TUBAL LIGATION         Family History   Problem Relation Name Age of Onset    Breast cancer Neg Hx      Ovarian cancer Neg Hx         Social History     Socioeconomic History    Marital status: Significant Other   Tobacco Use    Smoking status: Former     Current packs/day: 1.00     Average packs/day: 1 pack/day for 22.0 years (22.0 ttl pk-yrs)     Types: Cigarettes   Substance and Sexual Activity    Alcohol use: Yes     Alcohol/week: 0.8 standard drinks of alcohol     Types: 1 drink(s) per week     Comment: rarely    Drug use: No    Sexual activity: Yes     Partners: Male     Birth control/protection: See Surgical Hx       Current Medications[1]    Review of patient's allergies indicates:   Allergen Reactions    Penicillins        Review of System:   General: no chills, fever, night sweats, weight gain or weight loss  Psychological: no depression or suicidal ideation  Breasts: no new or changing breast lumps, nipple discharge or masses.  Respiratory: no cough, shortness of breath, or wheezing  Cardiovascular: no chest pain or dyspnea on exertion  Gastrointestinal: no abdominal pain, change in bowel habits, or black or bloody  stools  Genito-Urinary: As above  Musculoskeletal: no gait disturbance or muscular weakness     PE:   APPEARANCE: Well nourished, well developed, in no acute distress.  NECK: Neck symmetric without masses or thyromegaly.  NODES: No inguinal lymph node enlargement.  ABDOMEN: Soft. No tenderness or masses. No hepatosplenomegaly. No hernias.  BREASTS: Symmetrical, no skin changes or visible lesions. No palpable masses, nipple discharge or adenopathy bilaterally.  PELVIC: Normal external female genitalia without lesions.  NOTED ARE A SKIN TAG AND 1CM INCLUSION CYST NOTED AT THE RIGHT PERINEAL BODYNormal hair distribution. Adequate perineal body, normal urethral meatus. Vagina moist and well rugated without lesions or discharge. No significant cystocele or rectocele. Uterus and cervix surgically absent. Bimanual exam revealed no masses, tenderness or abnormality.    NOTE  NURSING PERSONAL PRESENT FOR ENTIRE PHYSICAL EXAM     dISCUSSED THE ABOVE AND WILL SCHEDULE EXCISON  pT DEFERS TODAY  Bse MONTHLY  sCREENING MAMMOGRAM AND RE[PEAT YEARLY    I spent a total of 30 minutes on the day of the visit. This includes face to face time and non-face to face time preparing to see the patient (eg, review of tests), obtaining and/or reviewing separately obtained history, documenting clinical information in the electronic or other health record, independently interpreting results and communicating results to the patient/family/caregiver, or care coordinator.            [1]   Current Outpatient Medications   Medication Sig Dispense Refill    ALPRAZolam (XANAX) 0.5 MG tablet Take 0.5 mg by mouth 3 (three) times daily as needed.        baclofen (LIORESAL) 10 MG tablet Take 10 mg by mouth.      etodolac (LODINE) 400 MG tablet Take 400 mg by mouth 2 (two) times daily as needed.      famotidine (PEPCID) 20 MG tablet Take 1 tablet by mouth 2 (two) times daily.      chlorhexidine (PERIDEX) 0.12 % solution SWISH AND SPIT TEN MILLILITERS BY  MOUTH TWICE DAILY FOR 5 DAYS. START 4/11/23 (Patient not taking: Reported on 5/28/2025) 473 mL 0    gabapentin (NEURONTIN) 300 MG capsule Take by mouth. (Patient not taking: Reported on 5/28/2025)      ondansetron (ZOFRAN-ODT) 8 MG TbDL Take 1 tablet (8 mg total) by mouth every 8 (eight) hours as needed (nausea). (Patient not taking: Reported on 5/28/2025) 20 tablet 0    oxyCODONE-acetaminophen (PERCOCET) 5-325 mg per tablet Take 1 tablet by mouth every 4 (four) hours as needed for Pain. 20 tablet 0    OZEMPIC 0.25 mg or 0.5 mg(2 mg/1.5 mL) pen injector Inject 0.5 mg into the skin every 7 days. (Patient not taking: Reported on 5/28/2025)      topiramate (TOPAMAX) 25 MG tablet Take 25 mg by mouth 2 (two) times daily. (Patient not taking: Reported on 5/28/2025)       No current facility-administered medications for this visit.     Facility-Administered Medications Ordered in Other Visits   Medication Dose Route Frequency Provider Last Rate Last Admin    HYDROmorphone injection 0.5 mg  0.5 mg Intravenous Q5 Min PRN Volpi-Abadie, Jacqueline, MD   0.5 mg at 04/13/23 0854    LORazepam injection 0.25 mg  0.25 mg Intravenous Q15 Min PRN Volpi-Abadie, Jacqueline, MD   0.25 mg at 04/13/23 0838    ondansetron injection 4 mg  4 mg Intravenous Daily PRN Volpi-Abadie, Jacqueline, MD   4 mg at 04/13/23 0817    oxyCODONE-acetaminophen 5-325 mg per tablet 1 tablet  1 tablet Oral Q3H PRN Volpi-Abadie, Jacqueline, MD        sodium chloride 0.9% flush 3 mL  3 mL Intravenous PRN Volpi-Abadie, Jacqueline, MD

## 2025-06-25 ENCOUNTER — OFFICE VISIT (OUTPATIENT)
Dept: OBSTETRICS AND GYNECOLOGY | Facility: CLINIC | Age: 50
End: 2025-06-25
Payer: MEDICAID

## 2025-06-25 VITALS
BODY MASS INDEX: 40.26 KG/M2 | WEIGHT: 227.31 LBS | SYSTOLIC BLOOD PRESSURE: 102 MMHG | DIASTOLIC BLOOD PRESSURE: 72 MMHG

## 2025-06-25 DIAGNOSIS — A63.0 CONDYLOMA: ICD-10-CM

## 2025-06-25 DIAGNOSIS — L91.8 CUTANEOUS SKIN TAGS: Primary | ICD-10-CM

## 2025-06-25 PROCEDURE — 56606 BIOPSY OF VULVA/PERINEUM: CPT | Mod: PBBFAC,PN | Performed by: SPECIALIST

## 2025-06-25 PROCEDURE — 99213 OFFICE O/P EST LOW 20 MIN: CPT | Mod: PBBFAC,PN | Performed by: SPECIALIST

## 2025-06-25 PROCEDURE — 56605 BIOPSY OF VULVA/PERINEUM: CPT | Mod: PBBFAC,PN | Performed by: SPECIALIST

## 2025-06-25 PROCEDURE — 99999 PR PBB SHADOW E&M-EST. PATIENT-LVL III: CPT | Mod: PBBFAC,,, | Performed by: SPECIALIST

## 2025-06-25 PROCEDURE — 88305 TISSUE EXAM BY PATHOLOGIST: CPT | Mod: TC | Performed by: SPECIALIST

## 2025-06-25 NOTE — PROGRESS NOTES
50 yoWF presents for follow up vulvar inclusion cyst and bothersome perineal skin tags  desires removal  Past Medical History:   Diagnosis Date    Abnormal Pap smear of cervix     Hepatitis B        Past Surgical History:   Procedure Laterality Date    APPENDECTOMY      CERVICAL BIOPSY  W/ LOOP ELECTRODE EXCISION      CHOLECYSTECTOMY      LAPAROSCOPIC SALPINGO-OOPHORECTOMY Bilateral 04/13/2023    Procedure: SALPINGO-OOPHORECTOMY, LAPAROSCOPIC;  Surgeon: Mariano Lima MD;  Location: [facility] OR;  Service: OB/GYN;  Laterality: Bilateral;    LAPAROSCOPIC TOTAL HYSTERECTOMY N/A 04/13/2023    Procedure: HYSTERECTOMY, TOTAL, LAPAROSCOPIC;  Surgeon: Mariano Lima MD;  Location: [facility] OR;  Service: OB/GYN;  Laterality: N/A;    NASAL SEPTUM SURGERY      RHIZOTOMY  09/2024    TUBAL LIGATION         Family History   Problem Relation Name Age of Onset    Breast cancer Neg Hx      Ovarian cancer Neg Hx         Social History     Socioeconomic History    Marital status: Significant Other   Tobacco Use    Smoking status: Former     Current packs/day: 1.00     Average packs/day: 1 pack/day for 22.0 years (22.0 ttl pk-yrs)     Types: Cigarettes   Substance and Sexual Activity    Alcohol use: Yes     Alcohol/week: 0.8 standard drinks of alcohol     Types: 1 drink(s) per week     Comment: rarely    Drug use: No    Sexual activity: Yes     Partners: Male     Birth control/protection: See Surgical Hx       Current Medications[1]    Review of patient's allergies indicates:   Allergen Reactions    Penicillins        Review of System:   General: no chills, fever, night sweats, weight gain or weight loss  Psychological: no depression or suicidal ideation  Breasts: no new or changing breast lumps, nipple discharge or masses.  Respiratory: no cough, shortness of breath, or wheezing  Cardiovascular: no chest pain or dyspnea on exertion  Gastrointestinal: no abdominal pain, change in bowel habits, or black or bloody stools  Genito-Urinary:  as above  Musculoskeletal: no gait disturbance or muscular weakness     EXAM  1cm NT inclusion cyst right vulva              5mm skin tag right inferior labia majora              4mm condylomatous flat lesion left perneal body    Procedure I and D inclusion cyst  After informed consent Betadine prep followed by Biofreeze with 1% lidocaine injected at base  #10 scalpel used to incise and drain with thick white d/c    Procedure Excision of skin tags  Betadine prep followed by Biofreeze application to perneal skin lesion and labial skin tag  1% lidicaine injected and skin tag and lesion base Excise with #10 scalpel Silver nitrate applied and well tolerated    Plan  Will send condylomatous lesion to path for exam  Sitz bath bid  RTO prn  .  I spent a total of 30 minutes on the day of the visit. This includes face to face time and non-face to face time preparing to see the patient (eg, review of tests), obtaining and/or reviewing separately obtained history, documenting clinical information in the electronic or other health record, independently interpreting results and communicating results to the patient/family/caregiver, or care coordinator.              [1]   Current Outpatient Medications   Medication Sig Dispense Refill    ALPRAZolam (XANAX) 0.5 MG tablet Take 0.5 mg by mouth 3 (three) times daily as needed.        baclofen (LIORESAL) 10 MG tablet Take 10 mg by mouth.      etodolac (LODINE) 400 MG tablet Take 400 mg by mouth 2 (two) times daily as needed.      famotidine (PEPCID) 20 MG tablet Take 1 tablet by mouth 2 (two) times daily.      chlorhexidine (PERIDEX) 0.12 % solution SWISH AND SPIT TEN MILLILITERS BY MOUTH TWICE DAILY FOR 5 DAYS. START 4/11/23 (Patient not taking: Reported on 6/25/2025) 473 mL 0     No current facility-administered medications for this visit.     Facility-Administered Medications Ordered in Other Visits   Medication Dose Route Frequency Provider Last Rate Last Admin    HYDROmorphone  injection 0.5 mg  0.5 mg Intravenous Q5 Min PRN Volpi-Abadie, Jacqueline, MD   0.5 mg at 04/13/23 0854    LORazepam injection 0.25 mg  0.25 mg Intravenous Q15 Min PRN Volpi-Abadie, Jacqueline, MD   0.25 mg at 04/13/23 0838    ondansetron injection 4 mg  4 mg Intravenous Daily PRN Volpi-Abadie, Jacqueline, MD   4 mg at 04/13/23 0817    oxyCODONE-acetaminophen 5-325 mg per tablet 1 tablet  1 tablet Oral Q3H PRN Volpi-Abadie, Jacqueline, MD        sodium chloride 0.9% flush 3 mL  3 mL Intravenous PRN Volpi-Abadie, Jacqueline, MD

## 2025-06-27 LAB
ESTROGEN SERPL-MCNC: ABNORMAL PG/ML
INSULIN SERPL-ACNC: ABNORMAL U[IU]/ML
LAB AP CLINICAL INFORMATION: ABNORMAL
LAB AP DIAGNOSIS CATEGORY: ABNORMAL
LAB AP GROSS DESCRIPTION: ABNORMAL
LAB AP PERFORMING LOCATION(S): ABNORMAL
LAB AP REPORT FOOTNOTES: ABNORMAL

## 2025-06-30 ENCOUNTER — RESULTS FOLLOW-UP (OUTPATIENT)
Dept: OBSTETRICS AND GYNECOLOGY | Facility: CLINIC | Age: 50
End: 2025-06-30

## 2025-07-09 ENCOUNTER — TELEPHONE (OUTPATIENT)
Dept: OBSTETRICS AND GYNECOLOGY | Facility: CLINIC | Age: 50
End: 2025-07-09
Payer: MEDICAID